# Patient Record
Sex: MALE | Race: BLACK OR AFRICAN AMERICAN | NOT HISPANIC OR LATINO | Employment: FULL TIME | URBAN - METROPOLITAN AREA
[De-identification: names, ages, dates, MRNs, and addresses within clinical notes are randomized per-mention and may not be internally consistent; named-entity substitution may affect disease eponyms.]

---

## 2020-07-15 ENCOUNTER — HOSPITAL ENCOUNTER (EMERGENCY)
Facility: HOSPITAL | Age: 25
Discharge: HOME | End: 2020-07-15
Attending: EMERGENCY MEDICINE
Payer: COMMERCIAL

## 2020-07-15 VITALS
HEART RATE: 97 BPM | RESPIRATION RATE: 16 BRPM | OXYGEN SATURATION: 97 % | SYSTOLIC BLOOD PRESSURE: 134 MMHG | BODY MASS INDEX: 22.28 KG/M2 | DIASTOLIC BLOOD PRESSURE: 88 MMHG | TEMPERATURE: 98 F | HEIGHT: 68 IN | WEIGHT: 147 LBS

## 2020-07-15 DIAGNOSIS — J02.9 PHARYNGITIS, UNSPECIFIED ETIOLOGY: Primary | ICD-10-CM

## 2020-07-15 DIAGNOSIS — J02.9 SORE THROAT: ICD-10-CM

## 2020-07-15 LAB — GROUP A STREP, MOLECULAR: POSITIVE

## 2020-07-15 PROCEDURE — 99283 EMERGENCY DEPT VISIT LOW MDM: CPT | Mod: ER

## 2020-07-15 PROCEDURE — 87651 STREP A DNA AMP PROBE: CPT | Mod: ER

## 2020-07-15 RX ORDER — CLINDAMYCIN HYDROCHLORIDE 150 MG/1
450 CAPSULE ORAL EVERY 8 HOURS
Qty: 45 CAPSULE | Refills: 0 | Status: SHIPPED | OUTPATIENT
Start: 2020-07-15 | End: 2020-07-20

## 2020-07-15 NOTE — Clinical Note
Bladimir Martinez was seen and treated in our emergency department on 7/15/2020.  He may return to work on 07/17/2020.       If you have any questions or concerns, please don't hesitate to call.      Brittany GAO

## 2020-07-15 NOTE — ED PROVIDER NOTES
Encounter Date: 7/15/2020       History     Chief Complaint   Patient presents with    Sore Throat     The history is provided by the patient.   Sore Throat  This is a recurrent problem. The current episode started yesterday. The problem occurs constantly. The problem has not changed since onset.Pertinent negatives include no chest pain, no abdominal pain, no headaches and no shortness of breath.     Review of patient's allergies indicates:   Allergen Reactions    Pcn [penicillins]      No past medical history on file.  No past surgical history on file.  No family history on file.  Social History     Tobacco Use    Smoking status: Not on file   Substance Use Topics    Alcohol use: Not on file    Drug use: Not on file     Review of Systems   Constitutional: Negative for fever.   HENT: Positive for sore throat.    Respiratory: Negative for shortness of breath.    Cardiovascular: Negative for chest pain.   Gastrointestinal: Negative for abdominal pain and nausea.   Genitourinary: Negative for dysuria.   Musculoskeletal: Negative for back pain.   Skin: Negative for rash.   Neurological: Negative for weakness and headaches.   Hematological: Does not bruise/bleed easily.       Physical Exam     Initial Vitals [07/15/20 1220]   BP Pulse Resp Temp SpO2   134/88 97 16 98.2 °F (36.8 °C) 97 %      MAP       --         Physical Exam    Nursing note and vitals reviewed.  Constitutional: He appears well-developed and well-nourished. No distress.   HENT:   Head: Normocephalic and atraumatic.   Mouth/Throat: Oropharyngeal exudate and posterior oropharyngeal erythema present.   Eyes: Conjunctivae and EOM are normal. Pupils are equal, round, and reactive to light.   Neck: Normal range of motion.   Cardiovascular: Normal rate. Exam reveals no gallop and no friction rub.    No murmur heard.  Pulmonary/Chest: No respiratory distress.   Abdominal: He exhibits no distension.   Musculoskeletal: Normal range of motion. No edema.    Lymphadenopathy:     He has cervical adenopathy.   Neurological: He is alert and oriented to person, place, and time. No cranial nerve deficit.   Skin: Skin is warm and dry. No rash noted.   Psychiatric: He has a normal mood and affect. Thought content normal.         ED Course   Procedures  Labs Reviewed   GROUP A STREP, MOLECULAR          Imaging Results    None                                          Clinical Impression:       ICD-10-CM ICD-9-CM   1. Pharyngitis, unspecified etiology  J02.9 462   2. Sore throat  J02.9 462           Disposition:   Disposition: Discharged  Condition: Stable     ED Disposition Condition    Discharge Stable        ED Prescriptions     Medication Sig Dispense Start Date End Date Auth. Provider    clindamycin (CLEOCIN) 150 MG capsule Take 3 capsules (450 mg total) by mouth every 8 (eight) hours. for 5 days 45 capsule 7/15/2020 7/20/2020 Valentino Mendosa MD        Follow-up Information     Follow up With Specialties Details Why Contact [facility]  Call in 1 day  [address]  [place] [address]  [phone]                                       Valentino Mendosa MD  07/15/20 1236

## 2020-09-11 ENCOUNTER — HOSPITAL ENCOUNTER (EMERGENCY)
Facility: HOSPITAL | Age: 25
Discharge: HOME | End: 2020-09-11
Attending: EMERGENCY MEDICINE
Payer: COMMERCIAL

## 2020-09-11 VITALS
WEIGHT: 141.13 LBS | HEIGHT: 66 IN | BODY MASS INDEX: 22.68 KG/M2 | DIASTOLIC BLOOD PRESSURE: 76 MMHG | HEART RATE: 68 BPM | RESPIRATION RATE: 20 BRPM | TEMPERATURE: 99 F | OXYGEN SATURATION: 100 % | SYSTOLIC BLOOD PRESSURE: 116 MMHG

## 2020-09-11 DIAGNOSIS — R07.9 CHEST PAIN, UNSPECIFIED TYPE: Primary | ICD-10-CM

## 2020-09-11 DIAGNOSIS — R07.9 CHEST PAIN: ICD-10-CM

## 2020-09-11 LAB
ALBUMIN SERPL BCP-MCNC: 4.5 G/DL (ref 3.5–5.2)
ALP SERPL-CCNC: 64 U/L (ref 55–135)
ALT SERPL W/O P-5'-P-CCNC: 23 U/L (ref 10–44)
ANION GAP SERPL CALC-SCNC: 11 MMOL/L (ref 8–16)
AST SERPL-CCNC: 29 U/L (ref 10–40)
BASOPHILS # BLD AUTO: 0.03 K/UL (ref 0–0.2)
BASOPHILS NFR BLD: 0.5 % (ref 0–1.9)
BILIRUB SERPL-MCNC: 0.8 MG/DL (ref 0.1–1)
BUN SERPL-MCNC: 11 MG/DL (ref 6–20)
CALCIUM SERPL-MCNC: 9.1 MG/DL (ref 8.7–10.5)
CHLORIDE SERPL-SCNC: 106 MMOL/L (ref 95–110)
CO2 SERPL-SCNC: 23 MMOL/L (ref 23–29)
CREAT SERPL-MCNC: 1.3 MG/DL (ref 0.5–1.4)
DIFFERENTIAL METHOD: ABNORMAL
EOSINOPHIL # BLD AUTO: 0 K/UL (ref 0–0.5)
EOSINOPHIL NFR BLD: 0.3 % (ref 0–8)
ERYTHROCYTE [DISTWIDTH] IN BLOOD BY AUTOMATED COUNT: 12.7 % (ref 11.5–14.5)
EST. GFR  (AFRICAN AMERICAN): >60 ML/MIN/1.73 M^2
EST. GFR  (NON AFRICAN AMERICAN): >60 ML/MIN/1.73 M^2
GLUCOSE SERPL-MCNC: 87 MG/DL (ref 70–110)
HCT VFR BLD AUTO: 38.7 % (ref 40–54)
HGB BLD-MCNC: 13.3 G/DL (ref 14–18)
HIV 1+2 AB+HIV1 P24 AG SERPL QL IA: NEGATIVE
IMM GRANULOCYTES # BLD AUTO: 0.02 K/UL (ref 0–0.04)
IMM GRANULOCYTES NFR BLD AUTO: 0.3 % (ref 0–0.5)
LYMPHOCYTES # BLD AUTO: 1.8 K/UL (ref 1–4.8)
LYMPHOCYTES NFR BLD: 31.4 % (ref 18–48)
MCH RBC QN AUTO: 29.9 PG (ref 27–31)
MCHC RBC AUTO-ENTMCNC: 34.4 G/DL (ref 32–36)
MCV RBC AUTO: 87 FL (ref 82–98)
MONOCYTES # BLD AUTO: 0.6 K/UL (ref 0.3–1)
MONOCYTES NFR BLD: 9.4 % (ref 4–15)
NEUTROPHILS # BLD AUTO: 3.4 K/UL (ref 1.8–7.7)
NEUTROPHILS NFR BLD: 58.1 % (ref 38–73)
NRBC BLD-RTO: 0 /100 WBC
PLATELET # BLD AUTO: 307 K/UL (ref 150–350)
PMV BLD AUTO: 9.3 FL (ref 9.2–12.9)
POTASSIUM SERPL-SCNC: 4 MMOL/L (ref 3.5–5.1)
PROT SERPL-MCNC: 7.7 G/DL (ref 6–8.4)
RBC # BLD AUTO: 4.45 M/UL (ref 4.6–6.2)
SODIUM SERPL-SCNC: 140 MMOL/L (ref 136–145)
TROPONIN I SERPL DL<=0.01 NG/ML-MCNC: 0.01 NG/ML (ref 0–0.03)
WBC # BLD AUTO: 5.83 K/UL (ref 3.9–12.7)

## 2020-09-11 PROCEDURE — 86703 HIV-1/HIV-2 1 RESULT ANTBDY: CPT

## 2020-09-11 PROCEDURE — 85025 COMPLETE CBC W/AUTO DIFF WBC: CPT | Mod: ER

## 2020-09-11 PROCEDURE — 93010 EKG 12-LEAD: ICD-10-PCS | Mod: ,,, | Performed by: INTERNAL MEDICINE

## 2020-09-11 PROCEDURE — 80053 COMPREHEN METABOLIC PANEL: CPT | Mod: ER

## 2020-09-11 PROCEDURE — 93005 ELECTROCARDIOGRAM TRACING: CPT | Mod: ER

## 2020-09-11 PROCEDURE — 84484 ASSAY OF TROPONIN QUANT: CPT | Mod: ER

## 2020-09-11 PROCEDURE — 99285 EMERGENCY DEPT VISIT HI MDM: CPT | Mod: 25,ER

## 2020-09-11 PROCEDURE — 93010 ELECTROCARDIOGRAM REPORT: CPT | Mod: ,,, | Performed by: INTERNAL MEDICINE

## 2020-09-11 NOTE — DISCHARGE INSTRUCTIONS
Primary Care Doctor in 5-7 days for recheck and possible referral to Cardiology.  Tylenol every 6 hours for pain if needed.

## 2020-09-11 NOTE — Clinical Note
"Bladimir Martinez ([place]) was seen and treated in our emergency department on 9/11/2020.  He may return to work on 09/12/2020.       If you have any questions or concerns, please don't hesitate to call.       RN    "

## 2020-09-29 ENCOUNTER — TELEPHONE (OUTPATIENT)
Dept: ORTHOPEDICS | Facility: CLINIC | Age: 25
End: 2020-09-29

## 2020-09-29 ENCOUNTER — HOSPITAL ENCOUNTER (OUTPATIENT)
Dept: RADIOLOGY | Facility: HOSPITAL | Age: 25
Discharge: HOME | End: 2020-09-29
Attending: NURSE PRACTITIONER
Payer: COMMERCIAL

## 2020-09-29 ENCOUNTER — OFFICE VISIT (OUTPATIENT)
Dept: INTERNAL MEDICINE | Facility: CLINIC | Age: 25
End: 2020-09-29
Payer: COMMERCIAL

## 2020-09-29 VITALS
OXYGEN SATURATION: 97 % | BODY MASS INDEX: 23.35 KG/M2 | WEIGHT: 145.31 LBS | HEART RATE: 90 BPM | SYSTOLIC BLOOD PRESSURE: 138 MMHG | DIASTOLIC BLOOD PRESSURE: 70 MMHG | TEMPERATURE: 100 F | HEIGHT: 66 IN

## 2020-09-29 DIAGNOSIS — M54.50 CHRONIC BILATERAL LOW BACK PAIN WITHOUT SCIATICA: ICD-10-CM

## 2020-09-29 DIAGNOSIS — M41.9 SCOLIOSIS, UNSPECIFIED SCOLIOSIS TYPE, UNSPECIFIED SPINAL REGION: ICD-10-CM

## 2020-09-29 DIAGNOSIS — Z00.00 ENCOUNTER FOR ROUTINE ADULT MEDICAL EXAMINATION: Primary | ICD-10-CM

## 2020-09-29 DIAGNOSIS — F32.A DEPRESSION, UNSPECIFIED DEPRESSION TYPE: ICD-10-CM

## 2020-09-29 DIAGNOSIS — R07.9 CHEST PAIN, UNSPECIFIED TYPE: ICD-10-CM

## 2020-09-29 DIAGNOSIS — Z11.3 SCREENING EXAMINATION FOR STD (SEXUALLY TRANSMITTED DISEASE): ICD-10-CM

## 2020-09-29 DIAGNOSIS — G89.29 CHRONIC BILATERAL LOW BACK PAIN WITHOUT SCIATICA: ICD-10-CM

## 2020-09-29 DIAGNOSIS — Z11.59 ENCOUNTER FOR HEPATITIS C SCREENING TEST FOR LOW RISK PATIENT: ICD-10-CM

## 2020-09-29 DIAGNOSIS — R44.0 AUDITORY HALLUCINATIONS: ICD-10-CM

## 2020-09-29 PROCEDURE — 99999 PR PBB SHADOW E&M-EST. PATIENT-LVL V: CPT | Mod: PBBFAC,,, | Performed by: NURSE PRACTITIONER

## 2020-09-29 PROCEDURE — 99999 PR PBB SHADOW E&M-EST. PATIENT-LVL V: ICD-10-PCS | Mod: PBBFAC,,, | Performed by: NURSE PRACTITIONER

## 2020-09-29 PROCEDURE — 3008F BODY MASS INDEX DOCD: CPT | Mod: CPTII,S$GLB,, | Performed by: NURSE PRACTITIONER

## 2020-09-29 PROCEDURE — 72082 X-RAY EXAM ENTIRE SPI 2/3 VW: CPT | Mod: TC,PO

## 2020-09-29 PROCEDURE — 99204 OFFICE O/P NEW MOD 45 MIN: CPT | Mod: S$GLB,,, | Performed by: NURSE PRACTITIONER

## 2020-09-29 PROCEDURE — 99204 PR OFFICE/OUTPT VISIT, NEW, LEVL IV, 45-59 MIN: ICD-10-PCS | Mod: S$GLB,,, | Performed by: NURSE PRACTITIONER

## 2020-09-29 PROCEDURE — 72082 XR SCOLIOSIS COMPLETE: ICD-10-PCS | Mod: 26,,, | Performed by: RADIOLOGY

## 2020-09-29 PROCEDURE — 3008F PR BODY MASS INDEX (BMI) DOCUMENTED: ICD-10-PCS | Mod: CPTII,S$GLB,, | Performed by: NURSE PRACTITIONER

## 2020-09-29 PROCEDURE — 72082 X-RAY EXAM ENTIRE SPI 2/3 VW: CPT | Mod: 26,,, | Performed by: RADIOLOGY

## 2020-09-29 PROCEDURE — 87491 CHLMYD TRACH DNA AMP PROBE: CPT

## 2020-09-29 NOTE — PROGRESS NOTES
"Subjective:       Patient ID: Bladimir Martinez Jr. is a 25 y.o. male.    Chief Complaint: Chest Pain    Mr Martinez presents to clinic to establish care and for ER follow up for chest pain. He reports that L sided chest pain has been intermittent for past 1-2 months. Went to ER on 9/11/2020, and had negative cardiac workup. Labs, EKG, and chest xray reviewed. He denies use of caffeine or energy drinks. No known triggers. Continues to have intermittently. See chest pain HPI below.     He also would like to see ortho for follow up of his scoliosis since has not seen anyone for this in over 10 years. Hx of lumbar scoliosis correction sx in when he was 15 y/o. Has been having low back pain for years. Has not had any therapy, imaging, or treatment for this in years.       He would also like to see psychiatry because he is concerned that he has a mental disorder because he feels like he hears voices with " two different personalities" in his head. He states that they have full conversations in his head about random things. Denies any violent, suicidal, or homicidal thoughts. He does report a hx of depression, has not been treated for this in the past. Auditory hallucinations occur almost daily.     Chest Pain   This is a recurrent problem. The current episode started more than 1 month ago. The onset quality is gradual. The problem occurs daily. The problem has been waxing and waning. The pain is present in the lateral region. The pain is moderate. The pain radiates to the left shoulder. Associated symptoms include back pain, irregular heartbeat, palpitations and shortness of breath (intermittent). Pertinent negatives include no abdominal pain, cough, dizziness, exertional chest pressure, fever, headaches, lower extremity edema, malaise/fatigue, nausea, near-syncope, numbness, sputum production, syncope, vomiting or weakness. The pain is aggravated by movement. He has tried rest for the symptoms. The treatment provided mild " relief. Risk factors include smoking/tobacco exposure and male gender.   His past medical history is significant for anxiety/panic attacks (mild).   Pertinent negatives for past medical history include no CAD, no congenital heart disease, no COPD, no CHF, no diabetes, no DVT, no hyperlipidemia, no MI and no pacemaker.   His family medical history is significant for CAD, diabetes, heart disease, hyperlipidemia, hypertension, PVD and stroke. Prior diagnostic workup includes chest x-ray.       Patient Active Problem List   Diagnosis    Chest pain    Auditory hallucinations    Depression    Scoliosis    Chronic bilateral low back pain without sciatica    Encounter for routine adult medical examination    Screening examination for STD (sexually transmitted disease)       Family History   Problem Relation Age of Onset    Heart disease Mother     Diabetes Mother     Hypertension Mother     Stroke Father     Hypertension Father      Past Surgical History:   Procedure Laterality Date    HERNIA REPAIR      SPINE SURGERY         No current outpatient medications on file.    Review of Systems   Constitutional: Negative for appetite change, chills, fatigue, fever and malaise/fatigue.   Respiratory: Positive for shortness of breath (intermittent). Negative for cough, sputum production, chest tightness and wheezing.    Cardiovascular: Positive for chest pain and palpitations. Negative for leg swelling, syncope and near-syncope.   Gastrointestinal: Negative for abdominal pain, diarrhea, nausea and vomiting.   Musculoskeletal: Positive for back pain. Negative for gait problem, joint swelling and myalgias.   Neurological: Negative for dizziness, speech difficulty, weakness, light-headedness, numbness and headaches.   Psychiatric/Behavioral: Positive for dysphoric mood (intermittent) and hallucinations (auditory). Negative for self-injury, sleep disturbance and suicidal ideas. The patient is not nervous/anxious.       "  Objective:   /70   Pulse 90   Temp 99.7 °F (37.6 °C) (Temporal)   Ht 5' 6" (1.676 m)   Wt 65.9 kg (145 lb 4.5 oz)   SpO2 97%   BMI 23.45 kg/m²      Physical Exam  Constitutional:       General: He is not in acute distress.     Appearance: Normal appearance. He is not ill-appearing.   HENT:      Head: Normocephalic and atraumatic.      Right Ear: Tympanic membrane normal.      Left Ear: Tympanic membrane normal.      Nose: Nose normal.      Mouth/Throat:      Mouth: Mucous membranes are moist.   Eyes:      Extraocular Movements: Extraocular movements intact.      Pupils: Pupils are equal, round, and reactive to light.   Neck:      Musculoskeletal: Normal range of motion and neck supple.   Cardiovascular:      Rate and Rhythm: Normal rate and regular rhythm.      Pulses: Normal pulses.      Heart sounds: Normal heart sounds. No murmur. No friction rub. No gallop.    Pulmonary:      Effort: Pulmonary effort is normal. No respiratory distress.      Breath sounds: Normal breath sounds.   Chest:      Chest wall: No tenderness.   Abdominal:      General: Abdomen is flat. There is no distension.      Palpations: Abdomen is soft.      Tenderness: There is no abdominal tenderness.   Musculoskeletal:      Lumbar back: He exhibits decreased range of motion and tenderness (mild midline TTP). He exhibits no swelling and no edema.      Right lower leg: No edema.      Left lower leg: No edema.      Comments: Fully healed scar from lumbar sx noted to lower back.    Lymphadenopathy:      Cervical: No cervical adenopathy.   Skin:     General: Skin is warm and dry.      Coloration: Skin is not pale.      Findings: No erythema.   Neurological:      General: No focal deficit present.      Mental Status: He is alert and oriented to person, place, and time.      Gait: Gait normal.   Psychiatric:         Attention and Perception: Attention normal. He perceives auditory hallucinations.         Mood and Affect: Mood and affect " normal. Mood is not anxious or depressed.         Speech: Speech normal.         Behavior: Behavior normal. Behavior is cooperative.         Thought Content: Thought content normal. Thought content is not paranoid. Thought content does not include homicidal or suicidal ideation.         Cognition and Memory: Cognition and memory normal.         Assessment & Plan     Problem List Items Addressed This Visit        Psychiatric    Depression    Current Assessment & Plan     Ref for psych. No thoughts of harming self or others. No acute episode at this time.          Relevant Orders    Ambulatory referral/consult to Psychiatry       ID    Screening examination for STD (sexually transmitted disease)    Current Assessment & Plan     Asymptomatic, but would like STD screening for routine wellness exam.          Relevant Orders    C. trachomatis/N. gonorrhoeae by AMP DNA [facility]; Urine    RPR (Completed)       Orthopedic    Scoliosis    Current Assessment & Plan     Doing scoliosis xray today to have for follow up visit with neurosx to ensure no sx interventions are necessary for correction. Tylenol/ibuprofen for now for pain relief.          Chronic bilateral low back pain without sciatica    Current Assessment & Plan     Doing scoliosis xray today to have for follow up visit with neurosx to ensure no sx interventions are necessary for correction. Tylenol/ibuprofen for now for pain relief.          Relevant Orders    Ambulatory referral/consult to Orthopedics       Other    Chest pain    Current Assessment & Plan     Ref for cardiology. Unremarkable workup from ER. Reports history of early heart disease in family.          Relevant Orders    Ambulatory referral/consult to Cardiology    Auditory hallucinations    Current Assessment & Plan     Ref for pscyh. Pt does not feel threatened by these voices, and does not seem to be a threat to self or others. He has been having these for years so will have him follow up out  "patient.          Relevant Orders    Ambulatory referral/consult to Psychiatry    Encounter for routine adult medical examination - Primary    Current Assessment & Plan     Declined vaccinations for now. Routine screening labs performed that were not performed in ER. Labs reviewed from ER. No red flags or concern with findings. Unremarkable physical exam except for back pain, sending referral for evaluation of scoliosis. Ref for psych for depression and auditory hallucinations.          Relevant Orders    Lipid Panel (Completed)      Other Visit Diagnoses     Encounter for hepatitis C screening test for low risk patient        Relevant Orders    Hepatitis C Antibody (Completed)        Follow up in about 3 months (around 12/29/2020).            Portions of this note may have been created with voice recognition software. Occasional "wrong-word" or "sound-a-like" substitutions may have occurred due to the inherent limitations of voice recognition software. Please, read the note carefully and recognize, using context, where substitutions have occurred.       "

## 2020-09-30 ENCOUNTER — OFFICE VISIT (OUTPATIENT)
Dept: CARDIOLOGY | Facility: CLINIC | Age: 25
End: 2020-09-30
Payer: COMMERCIAL

## 2020-09-30 VITALS
SYSTOLIC BLOOD PRESSURE: 134 MMHG | BODY MASS INDEX: 23.48 KG/M2 | OXYGEN SATURATION: 99 % | HEART RATE: 76 BPM | DIASTOLIC BLOOD PRESSURE: 82 MMHG | WEIGHT: 145.5 LBS

## 2020-09-30 DIAGNOSIS — R00.2 PALPITATIONS: Primary | ICD-10-CM

## 2020-09-30 DIAGNOSIS — R06.00 DYSPNEA, UNSPECIFIED TYPE: ICD-10-CM

## 2020-09-30 DIAGNOSIS — R42 DIZZINESS: ICD-10-CM

## 2020-09-30 DIAGNOSIS — R07.9 CHEST PAIN, UNSPECIFIED TYPE: ICD-10-CM

## 2020-09-30 PROCEDURE — 99999 PR PBB SHADOW E&M-EST. PATIENT-LVL III: ICD-10-PCS | Mod: PBBFAC,,, | Performed by: INTERNAL MEDICINE

## 2020-09-30 PROCEDURE — 99205 PR OFFICE/OUTPT VISIT, NEW, LEVL V, 60-74 MIN: ICD-10-PCS | Mod: S$GLB,,, | Performed by: INTERNAL MEDICINE

## 2020-09-30 PROCEDURE — 3008F BODY MASS INDEX DOCD: CPT | Mod: CPTII,S$GLB,, | Performed by: INTERNAL MEDICINE

## 2020-09-30 PROCEDURE — 99999 PR PBB SHADOW E&M-EST. PATIENT-LVL III: CPT | Mod: PBBFAC,,, | Performed by: INTERNAL MEDICINE

## 2020-09-30 PROCEDURE — 99205 OFFICE O/P NEW HI 60 MIN: CPT | Mod: S$GLB,,, | Performed by: INTERNAL MEDICINE

## 2020-09-30 PROCEDURE — 3008F PR BODY MASS INDEX (BMI) DOCUMENTED: ICD-10-PCS | Mod: CPTII,S$GLB,, | Performed by: INTERNAL MEDICINE

## 2020-09-30 NOTE — LETTER
September 30, 2020      Fidelia Reina NP  [address]  [place] [address]           [facility]  [address]  [place] [address]  Phone: [phone]          Patient: Bladimir Martinez Jr.   MR Number: [id number]   YOB: 1995   Date of Visit: 9/30/2020       Dear Fidelia Reina:    Thank you for referring Bladimir Martinez to me for evaluation. Attached you will find relevant portions of my assessment and plan of care.    If you have questions, please do not hesitate to call me. I look forward to following Bladimir Martinez along with you.    Sincerely,    Felipe Brooks MD    Enclosure  CC:  No Recipients    If you would like to receive this communication electronically, please contact [email or web address] or [phone] to request more information on [employer] Link access.    For providers and/or their staff who would like to refer a patient to [facility], please contact us through our one-stop-shop provider referral line, Angel Moreau, at [phone].    If you feel you have received this communication in error or would no longer like to receive these types of communications, please e-mail [email or web address]

## 2020-09-30 NOTE — PROGRESS NOTES
Subjective:   Patient ID:  Bladimir Martinez Jr. is a 25 y.o. male who presents for evaluation of Chest Pain (a little over a month. Pt states may be related to stress. ), Palpitations, and Tachycardia      25-year-old man, works as a ,  smokes marijuana 1-2/day, no other past medical history, family history of congestive heart failure in his grandmother, his mother had some kind of ablation at the age of 21.  He presents today for complaints of chest pain, palpitations, dizziness.  He states that for the last 6 weeks he has been having moderate left-sided chest pain, he states it is worsened by leaning forward, however this is not consistent, chest pain is pressure like, radiates to his left shoulder, can last up to all day, sometimes associated with dizziness, also dyspnea however not consistent.  The pain is not reproducible upon palpation.  Was not reproducible either upon left arm movements.  He was recently in the hospital for that same complaint, had a normal EKG and normal blood work.  Denies any syncope, loss of consciousness, falls.  Reports he used to drink energy drinks, NOS, he has stopped this for more than a month but still getting chest pains.  He states that he gets some palpitations especially when his exerting himself, however  outside exertion he will feel the palpitations much less.    Reviewed is EKG normal sinus rhythm normal EKG.      History reviewed. No pertinent past medical history.    Past Surgical History:   Procedure Laterality Date    HERNIA REPAIR      SPINE SURGERY         Social History     Tobacco Use    Smoking status: Current Some Day Smoker    Smokeless tobacco: Never Used   Substance Use Topics    Alcohol use: Never     Frequency: Never    Drug use: Yes     Types: Marijuana       Family History   Problem Relation Age of Onset    Heart disease Mother     Diabetes Mother     Hypertension Mother     Stroke Father     Hypertension Father        Review  of Systems   Constitution: Negative for fever and malaise/fatigue.   HENT: Negative for sore throat.    Eyes: Negative for blurred vision.   Cardiovascular: Positive for chest pain, dyspnea on exertion, near-syncope and palpitations. Negative for claudication, cyanosis, irregular heartbeat, leg swelling, orthopnea, paroxysmal nocturnal dyspnea and syncope.   Respiratory: Positive for shortness of breath. Negative for cough.    Hematologic/Lymphatic: Negative for bleeding problem.   Skin: Negative for poor wound healing and rash.   Musculoskeletal: Negative for back pain and falls.   Gastrointestinal: Negative for abdominal pain.   Genitourinary: Negative for nocturia.   Neurological: Positive for dizziness. Negative for focal weakness, headaches, light-headedness and loss of balance.   Psychiatric/Behavioral: Negative for altered mental status and substance abuse.       No current outpatient medications on file prior to visit.     No current facility-administered medications on file prior to visit.        Objective:   Objective:  Wt Readings from Last 3 Encounters:   09/30/20 66 kg (145 lb 8.1 oz)   09/29/20 65.9 kg (145 lb 4.5 oz)   09/11/20 64 kg (141 lb 1.5 oz)     Temp Readings from Last 3 Encounters:   09/29/20 99.7 °F (37.6 °C) (Temporal)   09/11/20 98.7 °F (37.1 °C) (Oral)   07/15/20 98.2 °F (36.8 °C) (Oral)     BP Readings from Last 3 Encounters:   09/30/20 134/82   09/29/20 138/70   09/11/20 116/76     Pulse Readings from Last 3 Encounters:   09/30/20 76   09/29/20 90   09/11/20 68       Physical Exam   Constitutional: He is oriented to person, place, and time. He appears well-developed and well-nourished.   HENT:   Head: Normocephalic and atraumatic.   Eyes: Conjunctivae are normal. No scleral icterus.   Neck: Normal range of motion. Neck supple.   Cardiovascular: Normal rate, regular rhythm, normal heart sounds and intact distal pulses.   No murmur heard.  Pulmonary/Chest: No respiratory distress. He has  no wheezes. He has no rales. He exhibits no tenderness.   Abdominal: Soft. Bowel sounds are normal. He exhibits no distension. There is no guarding.   Musculoskeletal: Normal range of motion.   Neurological: He is alert and oriented to person, place, and time.   Skin: Skin is warm.   Psychiatric: He has a normal mood and affect.   Vitals reviewed.      Lab Results   Component Value Date    CHOL 120 09/29/2020     Lab Results   Component Value Date    HDL 49 09/29/2020     Lab Results   Component Value Date    LDLCALC 55.8 (L) 09/29/2020     Lab Results   Component Value Date    TRIG 76 09/29/2020     Lab Results   Component Value Date    CHOLHDL 40.8 09/29/2020       Chemistry        Component Value Date/Time     09/11/2020 1148    K 4.0 09/11/2020 1148     09/11/2020 1148    CO2 23 09/11/2020 1148    BUN 11 09/11/2020 1148    CREATININE 1.3 09/11/2020 1148    GLU 87 09/11/2020 1148        Component Value Date/Time    CALCIUM 9.1 09/11/2020 1148    ALKPHOS 64 09/11/2020 1148    AST 29 09/11/2020 1148    ALT 23 09/11/2020 1148    BILITOT 0.8 09/11/2020 1148    ESTGFRAFRICA >60.0 09/11/2020 1148    EGFRNONAA >60.0 09/11/2020 1148          No results found for: TSH  No results found for: INR, PROTIME  Lab Results   Component Value Date    WBC 5.83 09/11/2020    HGB 13.3 (L) 09/11/2020    HCT 38.7 (L) 09/11/2020    MCV 87 09/11/2020     09/11/2020     BNP  @LABRCNTIP(BNP,BNPTRIAGEBLO)@  CrCl cannot be calculated (Patient's most recent lab result is older than the maximum 7 days allowed.).     Imaging:  ======    Diagnostic Results:  ECG: Reviewed. nsr   The ASCVD Risk score (Warddoc STEINBERG Jr., et al., 2013) failed to calculate for the following reasons:    The 2013 ASCVD risk score is only valid for ages 40 to 79    Assessment and Plan:   Palpitations, only upon exertion   -     TSH; Future; Expected date: 09/30/2020  -     Exercise Stress - EKG; Future        -     Continue to abstain from energy drinks  and excessive caffeine          Chest pain, unspecified type  -     Likely musculoskeletal , however not reproducible, normal baseline ecg, will perform a treadmill stress test     Dyspnea, unspecified type, on exertion  -     Echo Color Flow Doppler? Yes; Future    Dizziness with palpitations  Encouraged to continue proper PO hydration at work, works as a        Follow up in 6 weeks

## 2020-10-05 ENCOUNTER — OFFICE VISIT (OUTPATIENT)
Dept: NEUROSURGERY | Facility: CLINIC | Age: 25
End: 2020-10-05
Payer: COMMERCIAL

## 2020-10-05 VITALS
RESPIRATION RATE: 18 BRPM | SYSTOLIC BLOOD PRESSURE: 126 MMHG | HEART RATE: 79 BPM | WEIGHT: 145.75 LBS | BODY MASS INDEX: 23.42 KG/M2 | HEIGHT: 66 IN | DIASTOLIC BLOOD PRESSURE: 79 MMHG

## 2020-10-05 DIAGNOSIS — M54.50 CHRONIC BILATERAL LOW BACK PAIN WITHOUT SCIATICA: Primary | ICD-10-CM

## 2020-10-05 DIAGNOSIS — G89.29 CHRONIC BILATERAL LOW BACK PAIN WITHOUT SCIATICA: Primary | ICD-10-CM

## 2020-10-05 PROCEDURE — 3008F PR BODY MASS INDEX (BMI) DOCUMENTED: ICD-10-PCS | Mod: CPTII,S$GLB,, | Performed by: PHYSICIAN ASSISTANT

## 2020-10-05 PROCEDURE — 99203 OFFICE O/P NEW LOW 30 MIN: CPT | Mod: S$GLB,,, | Performed by: PHYSICIAN ASSISTANT

## 2020-10-05 PROCEDURE — 99203 PR OFFICE/OUTPT VISIT, NEW, LEVL III, 30-44 MIN: ICD-10-PCS | Mod: S$GLB,,, | Performed by: PHYSICIAN ASSISTANT

## 2020-10-05 PROCEDURE — 3008F BODY MASS INDEX DOCD: CPT | Mod: CPTII,S$GLB,, | Performed by: PHYSICIAN ASSISTANT

## 2020-10-05 PROCEDURE — 99999 PR PBB SHADOW E&M-EST. PATIENT-LVL IV: CPT | Mod: PBBFAC,,, | Performed by: PHYSICIAN ASSISTANT

## 2020-10-05 PROCEDURE — 99999 PR PBB SHADOW E&M-EST. PATIENT-LVL IV: ICD-10-PCS | Mod: PBBFAC,,, | Performed by: PHYSICIAN ASSISTANT

## 2020-10-05 NOTE — LETTER
October 5, 2020      [place]  [address] [address] [address]           [facility]  [address]  [place] [address]  Phone: [phone]  Fax: [phone]          Patient: Bladimir Martinez Jr.   MR Number: [id number]   YOB: 1995   Date of Visit: 10/5/2020       Dear Fidelia Reina:    Thank you for referring Bladimir Martinez to me for evaluation. Attached you will find relevant portions of my assessment and plan of care.    If you have questions, please do not hesitate to call me. I look forward to following Bladimir Martinez along with you.    Sincerely,    Mattie Horn PA-C    Enclosure  CC:  No Recipients    If you would like to receive this communication electronically, please contact [email or web address] or [phone] to request more information on "[employer] Link access.    For providers and/or their staff who would like to refer a patient to [facility], please contact us through our one-stop-shop provider referral line, Angel Moreau, at [phone].    If you feel you have received this communication in error or would no longer like to receive these types of communications, please e-mail [email or web address]

## 2020-10-05 NOTE — PROGRESS NOTES
Subjective:      Patient ID: Bladimir Martinez Jr. is a 25 y.o. male.    Chief Complaint: Lumbar Spine Pain (L-Spine) (LBP )    Mr. Martinez is a pleasant 26 yo male who presents to the neurosurgery clinic with complaints of low back pain.  He has a history of scoliosis and underwent surgery about 10 years ago.  He reports experiencing low back pain when trying to sit in a tub or getting in and out of a car.  He reports numbness of bilateral lower extremities on sitting.  He states he was only seen once after his multilevel fusion.  He denies any pain management, physical therapy, or conservative therapies.  He reports increased back pain during cold weather and when waking up.  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  He denies any bowel/bladder control issues.  He denies any nicotine use.  He rates his pain about a 7 or 8 on a scale of 1 to 10 with 10 being the worst pain.  He denies taking any medicine for pain.  He does report CP that started about 1-2 months ago which he is scheduled to see a cardiologist in November.  He does have spine x-rays for review today.    History reviewed. No pertinent past medical history.  Past Surgical History:   Procedure Laterality Date    HERNIA REPAIR      SPINE SURGERY       Family History   Problem Relation Age of Onset    Heart disease Mother     Diabetes Mother     Hypertension Mother     Stroke Father     Hypertension Father      Social History     Tobacco Use    Smoking status: Current Some Day Smoker    Smokeless tobacco: Never Used   Substance Use Topics    Alcohol use: Never     Frequency: Never    Drug use: Yes     Types: Marijuana     No current outpatient medications on file prior to visit.     No current facility-administered medications on file prior to visit.      Review of Systems   Constitutional: Negative for activity change, appetite change, chills, diaphoresis, fatigue, fever and unexpected weight change.   HENT:  Negative for nasal congestion, ear discharge, facial swelling, hearing loss, postnasal drip, rhinorrhea, tinnitus and trouble swallowing.    Eyes: Negative for photophobia, pain, discharge, redness, itching and visual disturbance.   Respiratory: Negative for apnea, cough, choking, chest tightness, shortness of breath and wheezing.    Cardiovascular: Positive for chest pain. Negative for leg swelling and claudication.   Gastrointestinal: Negative for abdominal distention, abdominal pain, change in bowel habit, constipation, diarrhea, fecal incontinence and change in bowel habit.   Endocrine: Negative for cold intolerance and heat intolerance.   Genitourinary: Negative for bladder incontinence, decreased urine volume, difficulty urinating, frequency, hematuria and urgency.   Musculoskeletal: Positive for back pain. Negative for arthralgias, gait problem, leg pain, neck pain and neck stiffness.   Neurological: Negative for dizziness, vertigo, tremors, seizures, syncope, facial asymmetry, speech difficulty, weakness, light-headedness, numbness, headaches, disturbances in coordination, memory loss and coordination difficulties.   Psychiatric/Behavioral: Negative for behavioral problems, confusion, decreased concentration, hallucinations and sleep disturbance. The patient is not nervous/anxious.          Objective:     Vitals:    10/05/20 0820   BP: 126/79   Pulse: 79   Resp: 18      Review of patient's allergies indicates:   Allergen Reactions    Pcn [penicillins] Swelling     childhood      Body mass index is 23.52 kg/m².     Physical Exam:  Nursing note and vitals reviewed.    Constitutional: He appears well-developed and well-nourished.   Pt is A&Ox3  Speech is clear and appropriate English  Nose: no rhinorrhea  Ears: no otorrhea  Neck: trachea midline, no masses  Mouth: has good dentition  Lungs: equal chest rise and fall  Head: normocephalic, non traumatic     Eyes: Pupils are equal, round, and reactive to light.      Cardiovascular: Normal rate.     Abdominal: Soft.     Skin: Skin displays no rash on trunk and no rash on extremities. Skin displays no lesions on trunk and no lesions on extremities.   Old scar on back from previous surgery.  Well healed and well approximated.  No keloid     Psych/Behavior: He is alert. He has a normal mood and affect.     Musculoskeletal: Gait is normal.        Neck: Range of motion is full. Muscle strength is 5/5. Tone is normal.        Back: Range of motion is limited. Muscle strength is 5/5. Tone is normal.        Right Upper Extremities: Range of motion is full. Muscle strength is 5/5. Tone is normal.        Left Upper Extremities: Range of motion is full. Muscle strength is 5/5. Tone is normal.       Right Lower Extremities: Range of motion is full. Muscle strength is 5/5. Tone is normal.        Left Lower Extremities: Range of motion is full. Muscle strength is 5/5. Tone is normal.      Comments: Negative bilateral straight leg raise  Negative Mei's  Negative Pronator drift     Neurological:        Coordination: He has a normal Romberg Test and normal finger to nose coordination.        Sensory: There is no sensory deficit in the trunk. There is no sensory deficit in the extremities.   Increased numbness on bilateral LEs when sitting       DTRs: Tricep reflexes are 1+ on the right side and 1+ on the left side. Bicep reflexes are 1+ on the right side and 1+ on the left side. Brachioradialis reflexes are 1+ on the right side and 1+ on the left side. Patellar reflexes are 1+ on the right side and 1+ on the left side. Achilles reflexes are 1+ on the right side and 1+ on the left side. He displays no Babinski's sign on the right side. He displays no Babinski's sign on the left side.        Cranial nerves: Cranial nerve(s) II, III, IV, V, VI, VII, VIII, IX, X, XI and XII are intact.     Neurologic Exam     Mental Status   Disoriented to person.   Registration: recalls 1 of 3 objects.  "  Speech: speech is normal   Level of consciousness: alert  Knowledge: good.     Cranial Nerves   Cranial nerves II through XII intact.     CN III, IV, VI   Pupils are equal, round, and reactive to light.    CN VIII   CN VIII normal.     CN IX, X   CN IX normal.   CN X normal.     CN XI   CN XI normal.     CN XII   CN XII normal.     Motor Exam   Muscle bulk: normal    Strength   Right neck flexion: 5/5  Left neck flexion: 5/5  Right neck extension: 5/5  Left neck extension: 5/5  Right deltoid: 5/5  Left deltoid: 5/5  Right biceps: 5/5  Left biceps: 5/5  Right triceps: 5/5  Left triceps: 5/5  Right wrist flexion: 5/5  Left wrist flexion: 5/5  Right wrist extension: 5/5  Left wrist extension: 5/5  Right interossei: 5/5  Left interossei: 5/5  Right abdominals: 5/5  Left abdominals: 5/5  Right iliopsoas: 5/5  Left iliopsoas: 5/5  Right quadriceps: 5/5  Left quadriceps: 5/5  Right hamstrin/5  Left hamstrin/5  Right glutei: 5/5  Left glutei: 5/5  Right anterior tibial: 5/5  Left anterior tibial: 5/5  Right posterior tibial: 5/5  Left posterior tibial: 5/5  Right peroneal: 5/5  Left peroneal: 5/5  Right gastroc: 5/5  Left gastroc: 5/5    Sensory Exam   Light touch normal.   Increased numbness on bilateral LEs when sitting     Gait, Coordination, and Reflexes     Gait  Gait: normal    Coordination   Romberg: negative    Tremor   Resting tremor: absent  Intention tremor: absent  Action tremor: absent    Reflexes   Right brachioradialis: 1+  Left brachioradialis: 1+  Right biceps: 1+  Left biceps: 1+  Right triceps: 1+  Left triceps: 1+  Right patellar: 1+  Left patellar: 1+  Right achilles: 1+  Left achilles: 1+  Right : 1+  Left : 1+  Right plantar: normal  Left plantar: normal  Right Mei: absent  Left Mei: absent  Right ankle clonus: absent  Left ankle clonus: absent    I have personally reviewed pertinent imaging studies.  The findings have been included below.  "EXAMINATION:  XR SCOLIOSIS " "COMPLETE     CLINICAL HISTORY:  Scoliosis, unspecified     TECHNIQUE:  AP and lateral views were obtained of the entire spine for scoliosis evaluation.     COMPARISON:  None     FINDINGS:  The patient is status post posterior instrumented fusion of T11 through L4.  No hardware complication appreciated.  No acute fractures or subluxations visualized.  There is mild dextroscoliosis of the thoracic spine with apex at T6 and Sánchez angle of 17°.  There is also mild levoscoliosis of the lumbar spine with apex at L2-3 and Sánchez angle of 15°.  No abnormalities of segmentation or formation appreciated.  Mild multilevel disc height loss noted throughout the fused portions of the lumbar and lower thoracic spine.     Impression:     Thoracolumbar scoliosis as above.        Electronically signed by: Salomon Joseph MD"    Imaging studies reviewed with patient.  Previous fusion from T11-L4 noted.  Instrumentation appears to be in appropriate positioning.   Assessment:     1. Chronic bilateral low back pain without sciatica      Plan:     Chronic bilateral low back pain without sciatica  -     Ambulatory referral/consult to Orthopedics  -     Ambulatory referral/consult to Physical/Occupational Therapy; Future; Expected date: 10/12/2020    Patient has not undergone any conservative therapy.  I am referring him the physical therapy, 2x/week for 6 weeks.  He will follow-up with me in 6 weeks to reassess condition.  He was encouraged to contact the neurosurgery clinic via phone or Mychart with any questions or concerns.    Thank you for the referral   Please call with any questions    Mattie Horn PA-C  Neurosurgery       "

## 2020-10-08 PROBLEM — G89.29 CHRONIC BILATERAL LOW BACK PAIN WITHOUT SCIATICA: Status: ACTIVE | Noted: 2020-10-08

## 2020-10-08 PROBLEM — M54.50 CHRONIC BILATERAL LOW BACK PAIN WITHOUT SCIATICA: Status: ACTIVE | Noted: 2020-10-08

## 2020-10-08 PROBLEM — M41.9 SCOLIOSIS: Status: ACTIVE | Noted: 2020-10-08

## 2020-10-08 PROBLEM — Z00.00 ENCOUNTER FOR ROUTINE ADULT MEDICAL EXAMINATION: Status: ACTIVE | Noted: 2020-10-08

## 2020-10-08 PROBLEM — Z11.3 SCREENING EXAMINATION FOR STD (SEXUALLY TRANSMITTED DISEASE): Status: ACTIVE | Noted: 2020-10-08

## 2020-10-08 NOTE — ASSESSMENT & PLAN NOTE
[facility]. Pt does not feel threatened by these voices, and does not seem to be a threat to self or others. He has been having these for years so will have him follow up out patient.    none

## 2020-10-08 NOTE — ASSESSMENT & PLAN NOTE
Ref for cardiology. Unremarkable workup from ER. Reports history of early heart disease in family.

## 2020-10-08 NOTE — ASSESSMENT & PLAN NOTE
Doing scoliosis xray today to have for follow up visit with neurosx to ensure no sx interventions are necessary for correction. Tylenol/ibuprofen for now for pain relief.

## 2020-10-08 NOTE — ASSESSMENT & PLAN NOTE
Declined vaccinations for now. Routine screening labs performed that were not performed in ER. Labs reviewed from ER. No red flags or concern with findings. Unremarkable physical exam except for back pain, sending referral for evaluation of scoliosis. Ref for psych for depression and auditory hallucinations.

## 2020-10-14 ENCOUNTER — HOSPITAL ENCOUNTER (OUTPATIENT)
Dept: CARDIOLOGY | Facility: HOSPITAL | Age: 25
Discharge: HOME | End: 2020-10-14
Attending: INTERNAL MEDICINE
Payer: COMMERCIAL

## 2020-10-14 VITALS
WEIGHT: 145 LBS | BODY MASS INDEX: 23.3 KG/M2 | DIASTOLIC BLOOD PRESSURE: 93 MMHG | SYSTOLIC BLOOD PRESSURE: 163 MMHG | HEIGHT: 66 IN

## 2020-10-14 DIAGNOSIS — R00.2 PALPITATIONS: ICD-10-CM

## 2020-10-14 PROCEDURE — 93018 CV STRESS TEST I&R ONLY: CPT | Mod: ,,, | Performed by: INTERNAL MEDICINE

## 2020-10-14 PROCEDURE — 93017 CV STRESS TEST TRACING ONLY: CPT | Mod: PO

## 2020-10-14 PROCEDURE — 93018 EXERCISE STRESS - EKG (CUPID ONLY): ICD-10-PCS | Mod: ,,, | Performed by: INTERNAL MEDICINE

## 2020-10-14 PROCEDURE — 93016 CV STRESS TEST SUPVJ ONLY: CPT | Mod: ,,, | Performed by: INTERNAL MEDICINE

## 2020-10-14 PROCEDURE — 93016 EXERCISE STRESS - EKG (CUPID ONLY): ICD-10-PCS | Mod: ,,, | Performed by: INTERNAL MEDICINE

## 2020-10-15 LAB
CV STRESS BASE HR: 75 BPM
DIASTOLIC BLOOD PRESSURE: 93 MMHG
OHS CV CPX 1 MINUTE RECOVERY HEART RATE: 187 BPM
OHS CV CPX 85 PERCENT MAX PREDICTED HEART RATE MALE: 164
OHS CV CPX ESTIMATED METS: 23.5
OHS CV CPX MAX PREDICTED HEART RATE: 193
OHS CV CPX PATIENT IS FEMALE: 0
OHS CV CPX PATIENT IS MALE: 1
OHS CV CPX PEAK DIASTOLIC BLOOD PRESSURE: 78 MMHG
OHS CV CPX PEAK HEAR RATE: 200 BPM
OHS CV CPX PEAK RATE PRESSURE PRODUCT: NORMAL
OHS CV CPX PEAK SYSTOLIC BLOOD PRESSURE: 178 MMHG
OHS CV CPX PERCENT MAX PREDICTED HEART RATE ACHIEVED: 104
OHS CV CPX RATE PRESSURE PRODUCT PRESENTING: NORMAL
STRESS ECHO POST EXERCISE DUR MIN: 19 MINUTES
STRESS ECHO POST EXERCISE DUR SEC: 0 SECONDS
SYSTOLIC BLOOD PRESSURE: 163 MMHG

## 2020-12-15 ENCOUNTER — OFFICE VISIT (OUTPATIENT)
Dept: INTERNAL MEDICINE | Facility: CLINIC | Age: 25
End: 2020-12-15
Payer: COMMERCIAL

## 2020-12-15 VITALS
SYSTOLIC BLOOD PRESSURE: 118 MMHG | TEMPERATURE: 98 F | WEIGHT: 150.38 LBS | OXYGEN SATURATION: 98 % | HEIGHT: 66 IN | HEART RATE: 71 BPM | BODY MASS INDEX: 24.17 KG/M2 | DIASTOLIC BLOOD PRESSURE: 60 MMHG

## 2020-12-15 DIAGNOSIS — Z20.2 EXPOSURE TO CHLAMYDIA: Primary | ICD-10-CM

## 2020-12-15 DIAGNOSIS — M54.50 CHRONIC BILATERAL LOW BACK PAIN WITHOUT SCIATICA: ICD-10-CM

## 2020-12-15 DIAGNOSIS — G89.29 CHRONIC BILATERAL LOW BACK PAIN WITHOUT SCIATICA: ICD-10-CM

## 2020-12-15 PROCEDURE — 99214 OFFICE O/P EST MOD 30 MIN: CPT | Mod: S$GLB,,, | Performed by: NURSE PRACTITIONER

## 2020-12-15 PROCEDURE — 3008F BODY MASS INDEX DOCD: CPT | Mod: CPTII,S$GLB,, | Performed by: NURSE PRACTITIONER

## 2020-12-15 PROCEDURE — 99214 PR OFFICE/OUTPT VISIT, EST, LEVL IV, 30-39 MIN: ICD-10-PCS | Mod: S$GLB,,, | Performed by: NURSE PRACTITIONER

## 2020-12-15 PROCEDURE — 99999 PR PBB SHADOW E&M-EST. PATIENT-LVL III: CPT | Mod: PBBFAC,,, | Performed by: NURSE PRACTITIONER

## 2020-12-15 PROCEDURE — 1125F PR PAIN SEVERITY QUANTIFIED, PAIN PRESENT: ICD-10-PCS | Mod: S$GLB,,, | Performed by: NURSE PRACTITIONER

## 2020-12-15 PROCEDURE — 99999 PR PBB SHADOW E&M-EST. PATIENT-LVL III: ICD-10-PCS | Mod: PBBFAC,,, | Performed by: NURSE PRACTITIONER

## 2020-12-15 PROCEDURE — 3008F PR BODY MASS INDEX (BMI) DOCUMENTED: ICD-10-PCS | Mod: CPTII,S$GLB,, | Performed by: NURSE PRACTITIONER

## 2020-12-15 PROCEDURE — 1125F AMNT PAIN NOTED PAIN PRSNT: CPT | Mod: S$GLB,,, | Performed by: NURSE PRACTITIONER

## 2020-12-15 RX ORDER — AZITHROMYCIN 250 MG/1
1000 TABLET, FILM COATED ORAL DAILY
Qty: 4 TABLET | Refills: 0 | Status: SHIPPED | OUTPATIENT
Start: 2020-12-15 | End: 2021-07-02

## 2020-12-15 NOTE — ASSESSMENT & PLAN NOTE
Chronic back pain due to scoliosis. Has tried PT and saw pain management. Requesting rx for tramadol, but discussed that he would need to follow back up with pain management to discuss this.

## 2020-12-15 NOTE — PATIENT INSTRUCTIONS
Chlamydia Urethritis, Treated (Male)  You have an infection in the channel in the penis that carries urine (the urethra). The infection is caused by the bacteria chlamydia. This infection is a sexually transmitted disease (STD). It is highly contagious. It's spread by sexual contact with an infected partner.     Symptoms most often begin within 1 week after you come in contact with the bacteria. But it may take 3 weeks for symptoms to show up. You may have a watery discharge from the penis and burning during urination.   This infection can be treated and cured. Treatment is with antibiotic medicine.  Home care  Follow these guidelines when caring for yourself at home:  · Your sexual partner needs to be treated even if he or she has no symptoms. Your partner should contact his or her own healthcare provider. Or your partner can go to an urgent care clinic or your local health department to be examined and treated.  · Avoid sexual activity until both you and your partner have finished all antibiotic medicine. You should wait until your provider tells you that you are no longer contagious.  · Take all antibiotic medicine as directed until it is finished. If you stop the medicine before you have finished it, symptoms may come back.  · Learn about safe sex practices and use these in the future. The safest sex is with a partner who has tested negative and has sex only with you. Condoms can help stop spreading some STDs. These include gonorrhea, chlamydia, and HIV. But condoms are not a guarantee.  Follow-up care  Follow up with your health care provider, or as advised. If a culture test was taken, you may call as advised for the results. You should have another culture test 4 to 6 weeks after treatment. This is to make sure the infection is gone. Call your provider or your local health department for complete STD screening. This includes HIV testing. Call the CDC National STD Hotline at 431-754-9407 for more information  about STDs.  When to seek medical advice  Call your health care provider right away if any of these occur:  · You dont get better after 3 days of treatment  · You cant urinate because of the pain  · Rash or joint pain  · Painful sores on the penis  · Enlarged painful lumps (lymph nodes) in the groin  · Testicle pain or swelling of the scrotum  · Fever of 100.4°F (38°C) or above, lasting for 24 to 48 hours  · Blood in urine   Date Last Reviewed: 1/1/2017 © 2000-2017 [employer]. [address] [place] [id number]. All rights reserved. This information is not intended as a substitute for professional medical care. Always follow your healthcare professional's instructions.        For Teens: Get Checked for STDs  Remember: Any kind of sex puts you at risk of STDs. Look for signs of STDs on you and your partner. Get checked if youre not sure. And if you do have an STD, get treated! Tell your partner so he or she can get checked, too.    Look for symptoms  Not all STDs have symptoms you can see. For example, chlamydia usually has no or only mild symptoms. But knowing common signs, such as irritation, feeling sore, and pain, will help. Pay attention to your body and your partners body. If either of you see or feel something that seems like a symptom, get checked.  Get checked  Getting checked is the only way to know for sure if you have an STD. You can get tested at a health clinic or healthcare providers office. Tests may include:  · Physical exam  · A sample (swab) of discharge from the penis, vagina, mouth, or rectum  · Urine and blood tests  · A sample of tissue, cell, or saliva  · A pelvic (vaginal) exam, or rectal exam  Get treated  Many STDs can be cured or treated with medicine. Early treatment can help keep things from getting worse. Follow all your instructions for treatment. And remember to:  · Avoid sex for as long as youre told. Otherwise, you could infect someone else.   · Take all  your medicines. And dont share medicines with your partner unless youre told to.  · Go back to your healthcare provider or clinic if your symptoms dont go away (or if they come back).  · Get treated for each STD you have.  Talk to your partner  If you have an STD, tell your partner so he or she can get checked. It may be embarrassing. But if you dont talk about it, your partner could end up having serious health problems. He or she could also pass the disease back to you or on to others. If youre not sure what to say, ask your healthcare provider or nurse.   Date Last Reviewed: 1/1/2017  © 0818-5079 [employer]. [address] [place] [address], [place] [address]. All rights reserved. This information is not intended as a substitute for professional medical care. Always follow your healthcare professional's instructions.

## 2020-12-15 NOTE — PROGRESS NOTES
Subjective:       Patient ID: Bladimir Martinez Jr. is a 25 y.o. male.    Chief Complaint: Exposure to STD and Back Pain    Mr. Martinez presents to clinic with complaints of exposure to STD. Partner dxd with chlamydia.     Exposure to STD  The patient's pertinent negatives include no genital injury, genital itching, genital lesions, pelvic pain, penile discharge, penile pain, priapism, scrotal swelling or testicular pain. Primary symptoms comment: penile tingling. This is a new problem. The current episode started in the past 7 days. The problem has been waxing and waning. The patient is experiencing no pain. Pertinent negatives include no abdominal pain, anorexia, chest pain, chills, constipation, coughing, diarrhea, discolored urine, dysuria, fever, flank pain, frequency, headaches, hematuria, hesitancy, joint pain, joint swelling, nausea, painful intercourse, rash, shortness of breath, sore throat, urgency, urinary retention or vomiting. Nothing aggravates the symptoms. He has tried nothing for the symptoms. The treatment provided no relief. He is sexually active. He inconsistently uses condoms. Yes, his partner has an STD. There is no history of BPH, chlamydia, cryptorchidism, erectile aid use, a femoral hernia, gonorrhea, herpes simplex, HIV, kidney stones, prostatitis or syphilis.       Patient Active Problem List   Diagnosis    Chest pain    Auditory hallucinations    Depression    Scoliosis    Chronic bilateral low back pain without sciatica    Encounter for routine adult medical examination    Screening examination for STD (sexually transmitted disease)    Exposure to chlamydia       Family History   Problem Relation Age of Onset    Heart disease Mother     Diabetes Mother     Hypertension Mother     Stroke Father     Hypertension Father      Past Surgical History:   Procedure Laterality Date    HERNIA REPAIR      SPINE SURGERY           Current Outpatient Medications:     azithromycin (Z-FRANCHESCA)  "250 MG tablet, Take 4 tablets (1,000 mg total) by mouth once daily., Disp: 4 tablet, Rfl: 0    Review of Systems   Constitutional: Negative for chills and fever.   HENT: Negative for sore throat.    Respiratory: Negative for cough and shortness of breath.    Cardiovascular: Negative for chest pain.   Gastrointestinal: Negative for abdominal pain, anorexia, constipation, diarrhea, nausea and vomiting.   Genitourinary: Negative for discharge, dysuria, flank pain, frequency, hesitancy, pelvic pain, penile pain, scrotal swelling, testicular pain and urgency.   Musculoskeletal: Positive for back pain. Negative for joint pain.   Skin: Negative for rash.   Neurological: Negative for headaches.       Objective:   /60   Pulse 71   Temp 98.4 °F (36.9 °C) (Temporal)   Ht 5' 6" (1.676 m)   Wt 68.2 kg (150 lb 5.7 oz)   SpO2 98%   BMI 24.27 kg/m²      Physical Exam  Constitutional:       General: He is not in acute distress.     Appearance: Normal appearance. He is not ill-appearing.   HENT:      Head: Normocephalic and atraumatic.   Cardiovascular:      Rate and Rhythm: Normal rate.   Pulmonary:      Effort: Pulmonary effort is normal. No respiratory distress.   Skin:     General: Skin is warm and dry.      Coloration: Skin is not pale.   Neurological:      General: No focal deficit present.      Mental Status: He is alert and oriented to person, place, and time.   Psychiatric:         Mood and Affect: Mood normal.         Behavior: Behavior normal.         Assessment & Plan     Problem List Items Addressed This Visit        ID    Exposure to chlamydia - Primary    Current Assessment & Plan     Will treat without testing due to delay of testing at this time. Dicussed shot for gonorrhea treatment also, but pt opted out since partner only tested positive for chlamydia.          Relevant Medications    azithromycin (Z-FRANCHESCA) 250 MG tablet       Orthopedic    Chronic bilateral low back pain without sciatica    Current " "Assessment & Plan     Chronic back pain due to scoliosis. Has tried PT and saw pain management. Requesting rx for tramadol, but discussed that he would need to follow back up with pain management to discuss this.              Follow up if symptoms worsen or fail to improve.            Portions of this note may have been created with voice recognition software. Occasional "wrong-word" or "sound-a-like" substitutions may have occurred due to the inherent limitations of voice recognition software. Please, read the note carefully and recognize, using context, where substitutions have occurred.       "

## 2020-12-15 NOTE — ASSESSMENT & PLAN NOTE
Will treat without testing due to delay of testing at this time. Dicussed shot for gonorrhea treatment also, but pt opted out since partner only tested positive for chlamydia.

## 2021-01-11 PROBLEM — Z00.00 ENCOUNTER FOR ROUTINE ADULT MEDICAL EXAMINATION: Status: RESOLVED | Noted: 2020-10-08 | Resolved: 2021-01-11

## 2021-01-27 DIAGNOSIS — Z20.2 EXPOSURE TO CHLAMYDIA: ICD-10-CM

## 2021-01-27 RX ORDER — AZITHROMYCIN 250 MG/1
1000 TABLET, FILM COATED ORAL DAILY
Qty: 4 TABLET | Refills: 0 | Status: CANCELLED | OUTPATIENT
Start: 2021-01-27

## 2021-01-28 ENCOUNTER — HOSPITAL ENCOUNTER (EMERGENCY)
Facility: HOSPITAL | Age: 26
Discharge: HOME | End: 2021-01-28
Attending: EMERGENCY MEDICINE
Payer: COMMERCIAL

## 2021-01-28 VITALS
SYSTOLIC BLOOD PRESSURE: 129 MMHG | DIASTOLIC BLOOD PRESSURE: 87 MMHG | RESPIRATION RATE: 18 BRPM | HEIGHT: 66 IN | TEMPERATURE: 98 F | OXYGEN SATURATION: 98 % | WEIGHT: 144.19 LBS | HEART RATE: 58 BPM | BODY MASS INDEX: 23.17 KG/M2

## 2021-01-28 DIAGNOSIS — B34.9 VIRAL SYNDROME: Primary | ICD-10-CM

## 2021-01-28 LAB
CTP QC/QA: YES
CTP QC/QA: YES
POC MOLECULAR INFLUENZA A AGN: NEGATIVE
POC MOLECULAR INFLUENZA B AGN: NEGATIVE
SARS-COV-2 RDRP RESP QL NAA+PROBE: NEGATIVE

## 2021-01-28 PROCEDURE — 99283 EMERGENCY DEPT VISIT LOW MDM: CPT | Mod: ER

## 2021-01-28 PROCEDURE — U0002 COVID-19 LAB TEST NON-CDC: HCPCS | Mod: ER | Performed by: EMERGENCY MEDICINE

## 2021-01-28 RX ORDER — NAPROXEN 375 MG/1
375 TABLET ORAL 2 TIMES DAILY WITH MEALS
Qty: 30 TABLET | Refills: 0 | Status: SHIPPED | OUTPATIENT
Start: 2021-01-28 | End: 2021-07-02

## 2021-02-10 ENCOUNTER — OFFICE VISIT (OUTPATIENT)
Dept: INTERNAL MEDICINE | Facility: CLINIC | Age: 26
End: 2021-02-10
Payer: COMMERCIAL

## 2021-02-10 DIAGNOSIS — Z20.822 CLOSE EXPOSURE TO COVID-19 VIRUS: Primary | ICD-10-CM

## 2021-02-10 PROBLEM — U07.1 COVID-19: Status: ACTIVE | Noted: 2021-02-10

## 2021-02-10 PROCEDURE — 99212 PR OFFICE/OUTPT VISIT, EST, LEVL II, 10-19 MIN: ICD-10-PCS | Mod: 95,,, | Performed by: FAMILY MEDICINE

## 2021-02-10 PROCEDURE — 99212 OFFICE O/P EST SF 10 MIN: CPT | Mod: 95,,, | Performed by: FAMILY MEDICINE

## 2021-04-07 ENCOUNTER — HOSPITAL ENCOUNTER (EMERGENCY)
Facility: HOSPITAL | Age: 26
Discharge: HOME | End: 2021-04-07
Attending: EMERGENCY MEDICINE
Payer: COMMERCIAL

## 2021-04-07 VITALS
BODY MASS INDEX: 22.38 KG/M2 | SYSTOLIC BLOOD PRESSURE: 114 MMHG | DIASTOLIC BLOOD PRESSURE: 76 MMHG | HEART RATE: 76 BPM | WEIGHT: 147.69 LBS | TEMPERATURE: 99 F | OXYGEN SATURATION: 98 % | HEIGHT: 68 IN | RESPIRATION RATE: 20 BRPM

## 2021-04-07 DIAGNOSIS — T78.40XA ALLERGIC REACTION, INITIAL ENCOUNTER: Primary | ICD-10-CM

## 2021-04-07 PROCEDURE — 99284 EMERGENCY DEPT VISIT MOD MDM: CPT | Mod: ER

## 2021-04-07 PROCEDURE — 63600175 PHARM REV CODE 636 W HCPCS: Mod: ER | Performed by: EMERGENCY MEDICINE

## 2021-04-07 RX ORDER — PREDNISONE 20 MG/1
60 TABLET ORAL
Status: COMPLETED | OUTPATIENT
Start: 2021-04-07 | End: 2021-04-07

## 2021-04-07 RX ORDER — DIPHENHYDRAMINE HCL 25 MG
25 CAPSULE ORAL EVERY 6 HOURS PRN
Qty: 20 CAPSULE | Refills: 0 | Status: SHIPPED | OUTPATIENT
Start: 2021-04-07 | End: 2021-05-26

## 2021-04-07 RX ORDER — METHYLPREDNISOLONE 4 MG/1
TABLET ORAL
Qty: 1 PACKAGE | Refills: 0 | Status: SHIPPED | OUTPATIENT
Start: 2021-04-07 | End: 2021-04-28

## 2021-04-07 RX ADMIN — PREDNISONE 60 MG: 20 TABLET ORAL at 02:04

## 2021-07-02 ENCOUNTER — OFFICE VISIT (OUTPATIENT)
Dept: INTERNAL MEDICINE | Facility: CLINIC | Age: 26
End: 2021-07-02
Payer: COMMERCIAL

## 2021-07-02 VITALS
HEIGHT: 68 IN | OXYGEN SATURATION: 98 % | DIASTOLIC BLOOD PRESSURE: 60 MMHG | WEIGHT: 144.81 LBS | TEMPERATURE: 99 F | SYSTOLIC BLOOD PRESSURE: 108 MMHG | HEART RATE: 61 BPM | BODY MASS INDEX: 21.95 KG/M2

## 2021-07-02 DIAGNOSIS — N48.1 BALANITIS: Primary | ICD-10-CM

## 2021-07-02 DIAGNOSIS — N48.89 PENILE PAIN: ICD-10-CM

## 2021-07-02 PROCEDURE — 1126F AMNT PAIN NOTED NONE PRSNT: CPT | Mod: S$GLB,,, | Performed by: NURSE PRACTITIONER

## 2021-07-02 PROCEDURE — 99214 PR OFFICE/OUTPT VISIT, EST, LEVL IV, 30-39 MIN: ICD-10-PCS | Mod: S$GLB,,, | Performed by: NURSE PRACTITIONER

## 2021-07-02 PROCEDURE — 3008F BODY MASS INDEX DOCD: CPT | Mod: CPTII,S$GLB,, | Performed by: NURSE PRACTITIONER

## 2021-07-02 PROCEDURE — 3008F PR BODY MASS INDEX (BMI) DOCUMENTED: ICD-10-PCS | Mod: CPTII,S$GLB,, | Performed by: NURSE PRACTITIONER

## 2021-07-02 PROCEDURE — 99214 OFFICE O/P EST MOD 30 MIN: CPT | Mod: S$GLB,,, | Performed by: NURSE PRACTITIONER

## 2021-07-02 PROCEDURE — 99999 PR PBB SHADOW E&M-EST. PATIENT-LVL IV: CPT | Mod: PBBFAC,,, | Performed by: NURSE PRACTITIONER

## 2021-07-02 PROCEDURE — 1126F PR PAIN SEVERITY QUANTIFIED, NO PAIN PRESENT: ICD-10-PCS | Mod: S$GLB,,, | Performed by: NURSE PRACTITIONER

## 2021-07-02 PROCEDURE — 99999 PR PBB SHADOW E&M-EST. PATIENT-LVL IV: ICD-10-PCS | Mod: PBBFAC,,, | Performed by: NURSE PRACTITIONER

## 2021-07-02 RX ORDER — CLOTRIMAZOLE 1 %
CREAM (GRAM) TOPICAL 2 TIMES DAILY
Qty: 24 G | Refills: 0 | Status: SHIPPED | OUTPATIENT
Start: 2021-07-02 | End: 2021-11-15 | Stop reason: ALTCHOICE

## 2021-07-05 PROBLEM — Z20.2 EXPOSURE TO CHLAMYDIA: Status: RESOLVED | Noted: 2020-12-15 | Resolved: 2021-07-05

## 2021-07-05 PROBLEM — Z11.3 SCREENING EXAMINATION FOR STD (SEXUALLY TRANSMITTED DISEASE): Status: RESOLVED | Noted: 2020-10-08 | Resolved: 2021-07-05

## 2021-11-15 ENCOUNTER — OFFICE VISIT (OUTPATIENT)
Dept: INTERNAL MEDICINE | Facility: CLINIC | Age: 26
End: 2021-11-15
Payer: COMMERCIAL

## 2021-11-15 VITALS
OXYGEN SATURATION: 99 % | BODY MASS INDEX: 21.49 KG/M2 | HEART RATE: 90 BPM | RESPIRATION RATE: 16 BRPM | SYSTOLIC BLOOD PRESSURE: 130 MMHG | TEMPERATURE: 99 F | DIASTOLIC BLOOD PRESSURE: 78 MMHG | WEIGHT: 141.31 LBS

## 2021-11-15 DIAGNOSIS — R36.9 PENILE DISCHARGE: Primary | ICD-10-CM

## 2021-11-15 DIAGNOSIS — R30.0 DYSURIA: ICD-10-CM

## 2021-11-15 PROBLEM — Z20.822 CLOSE EXPOSURE TO COVID-19 VIRUS: Status: RESOLVED | Noted: 2021-02-10 | Resolved: 2021-11-15

## 2021-11-15 LAB
BILIRUB UR QL STRIP: NEGATIVE
CLARITY UR REFRACT.AUTO: ABNORMAL
COLOR UR AUTO: YELLOW
GLUCOSE UR QL STRIP: NEGATIVE
HGB UR QL STRIP: ABNORMAL
KETONES UR QL STRIP: ABNORMAL
LEUKOCYTE ESTERASE UR QL STRIP: ABNORMAL
MICROSCOPIC COMMENT: ABNORMAL
NITRITE UR QL STRIP: NEGATIVE
PH UR STRIP: 6 [PH] (ref 5–8)
PROT UR QL STRIP: NEGATIVE
RBC #/AREA URNS AUTO: 8 /HPF (ref 0–4)
SP GR UR STRIP: 1.02 (ref 1–1.03)
URN SPEC COLLECT METH UR: ABNORMAL
UROBILINOGEN UR STRIP-ACNC: <2 EU/DL
WBC #/AREA URNS AUTO: 82 /HPF (ref 0–5)

## 2021-11-15 PROCEDURE — 3078F DIAST BP <80 MM HG: CPT | Mod: CPTII,S$GLB,, | Performed by: NURSE PRACTITIONER

## 2021-11-15 PROCEDURE — 99999 PR PBB SHADOW E&M-EST. PATIENT-LVL III: CPT | Mod: PBBFAC,,, | Performed by: NURSE PRACTITIONER

## 2021-11-15 PROCEDURE — 3078F PR MOST RECENT DIASTOLIC BLOOD PRESSURE < 80 MM HG: ICD-10-PCS | Mod: CPTII,S$GLB,, | Performed by: NURSE PRACTITIONER

## 2021-11-15 PROCEDURE — 1159F MED LIST DOCD IN RCRD: CPT | Mod: CPTII,S$GLB,, | Performed by: NURSE PRACTITIONER

## 2021-11-15 PROCEDURE — 1159F PR MEDICATION LIST DOCUMENTED IN MEDICAL RECORD: ICD-10-PCS | Mod: CPTII,S$GLB,, | Performed by: NURSE PRACTITIONER

## 2021-11-15 PROCEDURE — 3075F PR MOST RECENT SYSTOLIC BLOOD PRESS GE 130-139MM HG: ICD-10-PCS | Mod: CPTII,S$GLB,, | Performed by: NURSE PRACTITIONER

## 2021-11-15 PROCEDURE — 1160F RVW MEDS BY RX/DR IN RCRD: CPT | Mod: CPTII,S$GLB,, | Performed by: NURSE PRACTITIONER

## 2021-11-15 PROCEDURE — 3008F BODY MASS INDEX DOCD: CPT | Mod: CPTII,S$GLB,, | Performed by: NURSE PRACTITIONER

## 2021-11-15 PROCEDURE — 99213 OFFICE O/P EST LOW 20 MIN: CPT | Mod: S$GLB,,, | Performed by: NURSE PRACTITIONER

## 2021-11-15 PROCEDURE — 3044F PR MOST RECENT HEMOGLOBIN A1C LEVEL <7.0%: ICD-10-PCS | Mod: CPTII,S$GLB,, | Performed by: NURSE PRACTITIONER

## 2021-11-15 PROCEDURE — 3008F PR BODY MASS INDEX (BMI) DOCUMENTED: ICD-10-PCS | Mod: CPTII,S$GLB,, | Performed by: NURSE PRACTITIONER

## 2021-11-15 PROCEDURE — 3044F HG A1C LEVEL LT 7.0%: CPT | Mod: CPTII,S$GLB,, | Performed by: NURSE PRACTITIONER

## 2021-11-15 PROCEDURE — 1160F PR REVIEW ALL MEDS BY PRESCRIBER/CLIN PHARMACIST DOCUMENTED: ICD-10-PCS | Mod: CPTII,S$GLB,, | Performed by: NURSE PRACTITIONER

## 2021-11-15 PROCEDURE — 87086 URINE CULTURE/COLONY COUNT: CPT | Performed by: NURSE PRACTITIONER

## 2021-11-15 PROCEDURE — 99999 PR PBB SHADOW E&M-EST. PATIENT-LVL III: ICD-10-PCS | Mod: PBBFAC,,, | Performed by: NURSE PRACTITIONER

## 2021-11-15 PROCEDURE — 87491 CHLMYD TRACH DNA AMP PROBE: CPT | Performed by: NURSE PRACTITIONER

## 2021-11-15 PROCEDURE — 81000 URINALYSIS NONAUTO W/SCOPE: CPT | Mod: PO | Performed by: NURSE PRACTITIONER

## 2021-11-15 PROCEDURE — 3075F SYST BP GE 130 - 139MM HG: CPT | Mod: CPTII,S$GLB,, | Performed by: NURSE PRACTITIONER

## 2021-11-15 PROCEDURE — 87591 N.GONORRHOEAE DNA AMP PROB: CPT | Performed by: NURSE PRACTITIONER

## 2021-11-15 PROCEDURE — 99213 PR OFFICE/OUTPT VISIT, EST, LEVL III, 20-29 MIN: ICD-10-PCS | Mod: S$GLB,,, | Performed by: NURSE PRACTITIONER

## 2021-11-15 RX ORDER — DOXYCYCLINE 100 MG/1
100 CAPSULE ORAL 2 TIMES DAILY
Qty: 14 CAPSULE | Refills: 0 | Status: SHIPPED | OUTPATIENT
Start: 2021-11-15 | End: 2021-11-22

## 2021-11-17 LAB — BACTERIA UR CULT: NO GROWTH

## 2021-11-18 DIAGNOSIS — A54.9 GONORRHEA: Primary | ICD-10-CM

## 2021-11-18 LAB
C TRACH DNA SPEC QL NAA+PROBE: NOT DETECTED
N GONORRHOEA DNA SPEC QL NAA+PROBE: DETECTED

## 2021-11-18 RX ORDER — AZITHROMYCIN 500 MG/1
2000 TABLET, FILM COATED ORAL ONCE
Qty: 4 TABLET | Refills: 0 | Status: SHIPPED | OUTPATIENT
Start: 2021-11-18 | End: 2021-11-18

## 2021-11-23 ENCOUNTER — TELEPHONE (OUTPATIENT)
Dept: INTERNAL MEDICINE | Facility: CLINIC | Age: 26
End: 2021-11-23

## 2023-03-26 NOTE — ED PROVIDER NOTES
"Encounter Date: 9/11/2020       History     Chief Complaint   Patient presents with    Chest Pain     Left sided chest wall pain.     Chief complaint: Chest Pain    History of Present Illness: 24 y/o male with chest pain onset 1-1/2 months ago. Location left chest exclusively with no radiation of pain. States pain has persisted, though intermittent. No associated aggravating or alleviating factors. States mother had "open-heart surgery" at age 21 but unsure of nature of the procedure. No history of heart attack. No shortness of breath, fever, chills.cough or other accompanying symptoms. No radiation of the pain. No meds or other interventions prior to arrival.         Review of patient's allergies indicates:   Allergen Reactions    Pcn [penicillins]      History reviewed. No pertinent past medical history.  History reviewed. No pertinent surgical history.  History reviewed. No pertinent family history.  Social History     Tobacco Use    Smoking status: Current Some Day Smoker    Smokeless tobacco: Never Used   Substance Use Topics    Alcohol use: Never     Frequency: Never    Drug use: Never     Review of Systems   Constitutional: Negative for activity change, chills, diaphoresis and fever.   Eyes: Negative.    Respiratory: Negative for cough, chest tightness and shortness of breath.    Cardiovascular: Positive for chest pain. Negative for palpitations and leg swelling.   Gastrointestinal: Negative.  Negative for abdominal distention, abdominal pain, nausea and vomiting.   Neurological: Negative for dizziness, syncope, weakness and headaches.   Psychiatric/Behavioral: Negative.    All other systems reviewed and are negative.      Physical Exam     Initial Vitals [09/11/20 1138]   BP Pulse Resp Temp SpO2   133/85 86 20 98.7 °F (37.1 °C) 100 %      MAP       --         Physical Exam    Nursing note and vitals reviewed.  Constitutional: He appears well-developed and well-nourished. He is not diaphoretic. No " distress.   HENT:   Head: Normocephalic and atraumatic.   Right Ear: External ear normal.   Left Ear: External ear normal.   Nose: Nose normal.   Mouth/Throat: Oropharynx is clear and moist. No oropharyngeal exudate.   Eyes: Conjunctivae and EOM are normal. Pupils are equal, round, and reactive to light. No scleral icterus.   Neck: Normal range of motion. Neck supple. No thyromegaly present. No JVD present.   Cardiovascular: Normal rate, regular rhythm and intact distal pulses.   No murmur heard.  Pulmonary/Chest: Breath sounds normal. No stridor. No respiratory distress. He exhibits no tenderness.   Abdominal: Soft. He exhibits no distension. There is no abdominal tenderness.   Musculoskeletal: Normal range of motion.   Lymphadenopathy:     He has no cervical adenopathy.   Neurological: He is alert and oriented to person, place, and time. He has normal strength. GCS score is 15. GCS eye subscore is 4. GCS verbal subscore is 5. GCS motor subscore is 6.   Skin: Skin is warm and dry. Capillary refill takes less than 2 seconds.   Psychiatric: He has a normal mood and affect. His behavior is normal. Judgment and thought content normal.         ED Course   Procedures        EKG Readings: (Independently Interpreted)   Initial Reading: No STEMI. Rhythm: Sinus Arrhythmia. Heart Rate: 76. Ectopy: No Ectopy. Conduction: Normal. ST Segments: Normal ST Segments. T Waves: Normal. Axis: Normal. Clinical Impression: Sinus Arrhythmia       Imaging Results          X-Ray Chest AP Portable (Final result)  Result time 09/11/20 12:52:09    Final result by Chalo Felix III, MD (09/11/20 12:52:09)                 Impression:      Postop changes along the spine.  Scoliosis.  No acute cardiopulmonary abnormality suggested.      Electronically signed by: Chalo Felix MD  Date:    09/11/2020  Time:    12:52             Narrative:    EXAMINATION:  XR CHEST AP PORTABLE    CLINICAL HISTORY:  Chest pain,  unspecified    COMPARISON:  None    FINDINGS:  Normal heart size.  Clear lungs.  Slight scoliosis, convexity to the right.  Postop changes noted with fusion along the lumbar spine.                                    Labs Reviewed   CBC W/ AUTO DIFFERENTIAL - Abnormal; Notable for the following components:       Result Value    RBC 4.45 (*)     Hemoglobin 13.3 (*)     Hematocrit 38.7 (*)     All other components within normal limits   HIV 1 / 2 ANTIBODY   COMPREHENSIVE METABOLIC PANEL   TROPONIN I       Medical Decision Making:   Initial Assessment:   Pt very comfortable in no distress  Differential Diagnosis:   CAD/PE/Chest wall pain/Pleuritic pain/Nonspecific  Clinical Tests:   Lab Tests: Ordered and Reviewed  Radiological Study: Ordered and Reviewed  ED Management:  Patient advised of workup results and need for outpatient followup for nonemergent evaluation of his recurring pain. Low probability of CAD and no clinical indicators of PE or significant risk. Pt understands and agrees with treatment plan.                             Clinical Impression:              ICD-10-CM ICD-9-CM   1. Chest pain, unspecified type  R07.9 786.50   2. Chest pain  R07.9 786.50                Darrel Wild MD  09/13/20 0602     History/Exam/Medical Decision Making